# Patient Record
Sex: FEMALE | Race: WHITE | URBAN - METROPOLITAN AREA
[De-identification: names, ages, dates, MRNs, and addresses within clinical notes are randomized per-mention and may not be internally consistent; named-entity substitution may affect disease eponyms.]

---

## 2019-02-08 ENCOUNTER — LAB REQUISITION (OUTPATIENT)
Dept: LAB | Facility: HOSPITAL | Age: 49
End: 2019-02-08
Payer: COMMERCIAL

## 2019-02-08 DIAGNOSIS — N63.0 MASS OF BREAST: ICD-10-CM

## 2019-02-08 PROCEDURE — 88342 IMHCHEM/IMCYTCHM 1ST ANTB: CPT | Performed by: PATHOLOGY

## 2019-02-08 PROCEDURE — 88341 IMHCHEM/IMCYTCHM EA ADD ANTB: CPT | Performed by: PATHOLOGY

## 2019-02-08 PROCEDURE — 88305 TISSUE EXAM BY PATHOLOGIST: CPT | Performed by: PATHOLOGY

## 2021-04-06 DIAGNOSIS — Z23 ENCOUNTER FOR IMMUNIZATION: ICD-10-CM

## 2025-02-27 ENCOUNTER — OFFICE VISIT (OUTPATIENT)
Dept: OBGYN CLINIC | Facility: CLINIC | Age: 55
End: 2025-02-27
Payer: COMMERCIAL

## 2025-02-27 VITALS
SYSTOLIC BLOOD PRESSURE: 118 MMHG | BODY MASS INDEX: 48.07 KG/M2 | WEIGHT: 254.6 LBS | DIASTOLIC BLOOD PRESSURE: 68 MMHG | HEIGHT: 61 IN

## 2025-02-27 DIAGNOSIS — Z12.4 SCREENING FOR CERVICAL CANCER: ICD-10-CM

## 2025-02-27 DIAGNOSIS — Z12.11 COLON CANCER SCREENING: ICD-10-CM

## 2025-02-27 DIAGNOSIS — N39.3 STRESS INCONTINENCE: ICD-10-CM

## 2025-02-27 DIAGNOSIS — Z01.419 ENCOUNTER FOR ANNUAL ROUTINE GYNECOLOGICAL EXAMINATION: Primary | ICD-10-CM

## 2025-02-27 DIAGNOSIS — Z12.31 SCREENING MAMMOGRAM FOR BREAST CANCER: ICD-10-CM

## 2025-02-27 DIAGNOSIS — R23.2 HOT FLASH NOT DUE TO MENOPAUSE: ICD-10-CM

## 2025-02-27 PROCEDURE — G0145 SCR C/V CYTO,THINLAYER,RESCR: HCPCS | Performed by: OBSTETRICS & GYNECOLOGY

## 2025-02-27 PROCEDURE — G0476 HPV COMBO ASSAY CA SCREEN: HCPCS | Performed by: OBSTETRICS & GYNECOLOGY

## 2025-02-27 PROCEDURE — 99213 OFFICE O/P EST LOW 20 MIN: CPT | Performed by: OBSTETRICS & GYNECOLOGY

## 2025-02-27 PROCEDURE — S0610 ANNUAL GYNECOLOGICAL EXAMINA: HCPCS | Performed by: OBSTETRICS & GYNECOLOGY

## 2025-02-27 RX ORDER — ESTRADIOL 0.05 MG/D
1 PATCH, EXTENDED RELEASE TRANSDERMAL 2 TIMES WEEKLY
Qty: 24 PATCH | Refills: 4 | Status: SHIPPED | OUTPATIENT
Start: 2025-02-27

## 2025-02-27 RX ORDER — PROGESTERONE 100 MG/1
1 CAPSULE ORAL
Qty: 90 CAPSULE | Refills: 4 | Status: SHIPPED | OUTPATIENT
Start: 2025-02-27

## 2025-02-27 NOTE — ASSESSMENT & PLAN NOTE
- Discussed ACOG guidelines for pap smear screening frequency: performed today/not indicated today. Recommend repeat pap smear in XXX years.  - Discussed healthy lifestyle recommendations for diet, exercise and self breast awareness.  - Discussed ACOG recommendations for screening mammograms: overdue, ordered  - Discussed age based recommendations for adequate calcium and vitamin D intake. No additional osteoporosis screening indicated at this time.  - Discussed ACOG recommendations for colon cancer screening: no prior screening, GI referral placed for colonoscopy   - Safe sex practices were discussed and STI testing was not desired by the patient  - Contraceptive options were reviewed: n/a postmenopausal   - Routine follow up in 1 year was recommended or sooner as needed. All questions and concerns were addressed.

## 2025-02-27 NOTE — ASSESSMENT & PLAN NOTE
Vasomotor symptoms occur up to 80% of women an most women can describe them as severe. Most symptoms occur in the transition period but can also happen in late and postmenopausal period. Mean duration is about 5 years but symptoms can last even up to 10 years after the final menstrual period. General behavioral measures include lowering room temperature, layering of clothes, and removal of triggers such as spicy foods or stress. Women with moderate to severe symptoms may need medication in the form or hormonal therapy or non-hormonal therapy.    Discussed with patient menopausal hormone therapy (also known as HRT) weighing risks versus benefits. Risks associated with hormone replacement may include stroke, VTE, cardiovascular disease, breast cancer, endometrial hyperplasia and carcinoma. Discussed with the primary goal is to relieve hot flashes. The other symptoms associated with menopausal symptoms include sleep disturbances, mood disorders, and in some instances joint aches and pains.  Discussed that women being treated for menopausal symptoms such as hot flashes require systemic/oral estrogen while women with vaginal atrophy should be treated with topical/vaginal estrogen. With regards to route, available forms are oral tablets or transdermal in the form of patches or gels/spray. Discussed at the lowest effective dose should be used. Lower dosages typically are associated with less vaginal bleeding and less breast tenderness as well as few or affects on coagulation and lower risk of stroke and VTE.    Reviewed that ideal timing for initiation of hormone therapy would be within 10 years of menopause and younger than age 60 years old.     All women with intact uterus needed progestin  to prevent endometrial hyperplasia.  Dosing for progesterone may be micronized progesterone 200 mg per day for 12 days per month as a cyclic regimen or 100 mg daily continuous regimen.     In early postmenopause or late menopausal  transition, it yearly we start with oral estradiol 0.5 mg daily or 0.025mg twice weekly patch  with moderate symptoms.  For those with more severe symptoms 1 mg daily of estradiol is given or transdermal 0.05 mg twice weekly. Low estrogen oral contraceptive as an option for lori-menopausal woman who seek relief of menopausal symptoms and also desire contraception, given that they are candidates to receive contraceptive doses of estrogen. Typically, if hot flashes or completely relieved and patient is tolerating hormone replacement, this regimen is continued for several years with a goal of no more than 5 years total.     - pt desires estradiol patch with continuous oral progesterone. Will f/u in 3 months

## 2025-02-27 NOTE — PROGRESS NOTES
Subjective      Jess Rodrigues is a 54 y.o. female who presents for annual exam.      Chief Complaint   Patient presents with    New Patient Visit     Est care. Yearly, last yearly was in . Thinks she is in menopause and would like to discuss.       LMP, no bleeding since then   Frequent hot flashes, hair loss, weight gain  Hot flashes throughout the day but mostly at night - disrupting sleep   Denies vaginal sx or pain with intercourse   Stress incontinence - occasionally at night urgency episodes. Has to wear a pad most days   Denies prolapse sx           Last Pap:  ASCUS/HPV neg. 2017 ASC-H/HPV+  Last mammogram:  BIRADS1  Colorectal cancer screening: no prior   DEXA: n/a       Current contraception: post menopausal status  History of abnormal Pap smear: yes   History of abnormal mammogram: yes - left breast biopsy benign ()      Family history of uterine or ovarian cancer: no  Family history of breast cancer: no  Family history of colon cancer: no      Menstrual History:  OB History          3    Para   3    Term   3            AB        Living   3         SAB        IAB        Ectopic        Multiple        Live Births   3                    No LMP recorded (lmp unknown).         History reviewed. No pertinent past medical history.  Past Surgical History:   Procedure Laterality Date    BREAST BIOPSY  2019     Family History   Problem Relation Age of Onset    Diabetes Son     Breast cancer Neg Hx     Colon cancer Neg Hx     Ovarian cancer Neg Hx        Social History     Tobacco Use    Smoking status: Never    Smokeless tobacco: Never   Substance Use Topics    Alcohol use: Yes     Alcohol/week: 3.0 standard drinks of alcohol     Types: 3 Glasses of wine per week    Drug use: Not Currently     Types: Marijuana          Current Outpatient Medications:     estradiol (Vivelle-Dot) 0.05 MG/24HR, Place 1 patch on the skin 2 (two) times a week, Disp: 24 patch, Rfl: 4     "Progesterone 100 MG CAPS, Take 1 tablet by mouth daily at bedtime, Disp: 90 capsule, Rfl: 4    No Known Allergies        Review of Systems   Constitutional:  Negative for appetite change, chills and fever.   Eyes:  Negative for visual disturbance.   Respiratory:  Negative for cough, chest tightness and shortness of breath.    Cardiovascular:  Negative for chest pain.   Gastrointestinal:  Negative for abdominal distention, abdominal pain, constipation, diarrhea, nausea and vomiting.   Endocrine: Negative for cold intolerance and heat intolerance.   Genitourinary:  Negative for difficulty urinating, dyspareunia, dysuria, frequency, genital sores, pelvic pain, urgency, vaginal bleeding, vaginal discharge and vaginal pain.   Musculoskeletal:  Negative for arthralgias.   Neurological:  Negative for light-headedness and headaches.   Hematological:  Does not bruise/bleed easily.   Psychiatric/Behavioral:  Negative for behavioral problems.    All other systems reviewed and are negative.      /68 (BP Location: Right arm, Patient Position: Sitting, Cuff Size: Standard)   Ht 5' 1.2\" (1.554 m)   Wt 115 kg (254 lb 9.6 oz)   LMP  (LMP Unknown)   BMI 47.79 kg/m²         Physical Exam  Constitutional:       General: She is not in acute distress.     Appearance: Normal appearance. She is obese.   Genitourinary:      Vulva, bladder and urethral meatus normal.      No lesions in the vagina.      Right Labia: No rash, tenderness, lesions or skin changes.     Left Labia: No tenderness, lesions, skin changes or rash.     No labial fusion noted.      No inguinal adenopathy present in the right or left side.     No vaginal discharge, erythema, tenderness, bleeding or ulceration.      No vaginal prolapse present.       Right Adnexa: not tender, not full and no mass present.     Left Adnexa: not tender, not full and no mass present.     No cervical motion tenderness, discharge, friability, lesion or polyp.      Uterus is not " enlarged, fixed, tender or irregular.      No uterine mass detected.     Uterus is anteverted.      Pelvic exam was performed with patient in the lithotomy position.   Breasts:     Right: No swelling, bleeding, inverted nipple, mass, nipple discharge, skin change or tenderness.      Left: No swelling, bleeding, inverted nipple, mass, nipple discharge, skin change or tenderness.   HENT:      Head: Normocephalic and atraumatic.   Neck:      Thyroid: No thyromegaly.   Cardiovascular:      Rate and Rhythm: Normal rate and regular rhythm.   Pulmonary:      Effort: Pulmonary effort is normal. No accessory muscle usage or respiratory distress.   Abdominal:      General: There is no distension.      Palpations: Abdomen is soft.      Tenderness: There is no abdominal tenderness. There is no guarding or rebound.   Musculoskeletal:         General: Normal range of motion.      Cervical back: Normal range of motion and neck supple.   Lymphadenopathy:      Upper Body:      Right upper body: No supraclavicular or axillary adenopathy.      Left upper body: No supraclavicular or axillary adenopathy.      Lower Body: No right inguinal and no right inguinal adenopathy. No left inguinal and no left inguinal adenopathy.   Neurological:      General: No focal deficit present.      Mental Status: She is alert.   Skin:     General: Skin is warm and dry.      Findings: No erythema.   Psychiatric:         Mood and Affect: Mood normal.         Behavior: Behavior normal.   Vitals and nursing note reviewed. Exam conducted with a chaperone present.               Stress incontinence  Urogyn referral placed     Hot flash not due to menopause  Vasomotor symptoms occur up to 80% of women an most women can describe them as severe. Most symptoms occur in the transition period but can also happen in late and postmenopausal period. Mean duration is about 5 years but symptoms can last even up to 10 years after the final menstrual period. General  behavioral measures include lowering room temperature, layering of clothes, and removal of triggers such as spicy foods or stress. Women with moderate to severe symptoms may need medication in the form or hormonal therapy or non-hormonal therapy.    Discussed with patient menopausal hormone therapy (also known as HRT) weighing risks versus benefits. Risks associated with hormone replacement may include stroke, VTE, cardiovascular disease, breast cancer, endometrial hyperplasia and carcinoma. Discussed with the primary goal is to relieve hot flashes. The other symptoms associated with menopausal symptoms include sleep disturbances, mood disorders, and in some instances joint aches and pains.  Discussed that women being treated for menopausal symptoms such as hot flashes require systemic/oral estrogen while women with vaginal atrophy should be treated with topical/vaginal estrogen. With regards to route, available forms are oral tablets or transdermal in the form of patches or gels/spray. Discussed at the lowest effective dose should be used. Lower dosages typically are associated with less vaginal bleeding and less breast tenderness as well as few or affects on coagulation and lower risk of stroke and VTE.    Reviewed that ideal timing for initiation of hormone therapy would be within 10 years of menopause and younger than age 60 years old.     All women with intact uterus needed progestin  to prevent endometrial hyperplasia.  Dosing for progesterone may be micronized progesterone 200 mg per day for 12 days per month as a cyclic regimen or 100 mg daily continuous regimen.     In early postmenopause or late menopausal transition, it yearly we start with oral estradiol 0.5 mg daily or 0.025mg twice weekly patch  with moderate symptoms.  For those with more severe symptoms 1 mg daily of estradiol is given or transdermal 0.05 mg twice weekly. Low estrogen oral contraceptive as an option for lori-menopausal woman who seek  relief of menopausal symptoms and also desire contraception, given that they are candidates to receive contraceptive doses of estrogen. Typically, if hot flashes or completely relieved and patient is tolerating hormone replacement, this regimen is continued for several years with a goal of no more than 5 years total.     - pt desires estradiol patch with continuous oral progesterone. Will f/u in 3 months     Encounter for annual routine gynecological examination  - Discussed ACOG guidelines for pap smear screening frequency: performed today/not indicated today. Recommend repeat pap smear in XXX years.  - Discussed healthy lifestyle recommendations for diet, exercise and self breast awareness.  - Discussed ACOG recommendations for screening mammograms: overdue, ordered  - Discussed age based recommendations for adequate calcium and vitamin D intake. No additional osteoporosis screening indicated at this time.  - Discussed ACOG recommendations for colon cancer screening: no prior screening, GI referral placed for colonoscopy   - Safe sex practices were discussed and STI testing was not desired by the patient  - Contraceptive options were reviewed: n/a postmenopausal   - Routine follow up in 1 year was recommended or sooner as needed. All questions and concerns were addressed.

## 2025-02-28 LAB
HPV HR 12 DNA CVX QL NAA+PROBE: NEGATIVE
HPV16 DNA CVX QL NAA+PROBE: NEGATIVE
HPV18 DNA CVX QL NAA+PROBE: NEGATIVE

## 2025-03-05 LAB
LAB AP GYN PRIMARY INTERPRETATION: NORMAL
Lab: NORMAL

## 2025-03-06 ENCOUNTER — RESULTS FOLLOW-UP (OUTPATIENT)
Dept: OBGYN CLINIC | Facility: CLINIC | Age: 55
End: 2025-03-06

## 2025-03-29 PROBLEM — Z01.419 ENCOUNTER FOR ANNUAL ROUTINE GYNECOLOGICAL EXAMINATION: Status: RESOLVED | Noted: 2025-02-27 | Resolved: 2025-03-29

## 2025-04-08 ENCOUNTER — TELEPHONE (OUTPATIENT)
Age: 55
End: 2025-04-08

## 2025-04-08 ENCOUNTER — OFFICE VISIT (OUTPATIENT)
Dept: FAMILY MEDICINE CLINIC | Facility: CLINIC | Age: 55
End: 2025-04-08
Payer: COMMERCIAL

## 2025-04-08 VITALS
SYSTOLIC BLOOD PRESSURE: 134 MMHG | HEIGHT: 61 IN | WEIGHT: 255.2 LBS | DIASTOLIC BLOOD PRESSURE: 82 MMHG | OXYGEN SATURATION: 96 % | BODY MASS INDEX: 48.18 KG/M2 | HEART RATE: 87 BPM | TEMPERATURE: 97.1 F | RESPIRATION RATE: 16 BRPM

## 2025-04-08 DIAGNOSIS — Z00.00 ANNUAL PHYSICAL EXAM: Primary | ICD-10-CM

## 2025-04-08 DIAGNOSIS — E66.813 CLASS 3 SEVERE OBESITY WITHOUT SERIOUS COMORBIDITY WITH BODY MASS INDEX (BMI) OF 45.0 TO 49.9 IN ADULT, UNSPECIFIED OBESITY TYPE: ICD-10-CM

## 2025-04-08 PROCEDURE — 99386 PREV VISIT NEW AGE 40-64: CPT | Performed by: FAMILY MEDICINE

## 2025-04-08 RX ORDER — TIRZEPATIDE 2.5 MG/.5ML
2.5 INJECTION, SOLUTION SUBCUTANEOUS WEEKLY
Qty: 2 ML | Refills: 0 | Status: SHIPPED | OUTPATIENT
Start: 2025-04-08 | End: 2025-05-06

## 2025-04-08 NOTE — PROGRESS NOTES
Adult Annual Physical  Name: Jess Rodrigues      : 1970      MRN: [id number]  Encounter Provider: Kristen Howell MD  Encounter Date: 2025   Encounter department: [facility]    :  Assessment & Plan  Annual physical exam  - Satisfactory physical examination   - Patient already scheduled for mammogram and recently had pap smear  - Referral to Gastroenterology already in the system for colon cancer screening        Class 3 severe obesity without serious comorbidity with body mass index (BMI) of 45.0 to 49.9 in adult, unspecified obesity type (HCC)  Prior Authorization Clinical Questions for Weight Management Pharmacotherapy    1. Does the patient have a contrainidcation to medication prescribed for weight management?: No  2. Does the patient have a diagnosis of obesity, confirmed by a BMI greater than or equal to 30 kg/m^2?: Yes  3. Does the patient have a BMI of greater than or equal to 27 kg/m^2 with at least one weight-related comorbidity/risk factor/complication (e.g. diabetes, dyslipidemia, coronary artery disease)?: No  5. WEGOVY CVA Indication: Does patient have established documented cardiovascular disease (history of a prior heart attack (myocardial infarction), stroke, or symptomatic peripheral arterial disease (PAD)?: No  6. ZEPBOUND MAURA Indication: Does patient have documented MAURA diagnosed via sleep study (insurance will require copy of sleep study results for approval)?: No  7. Has the patient been on a weight loss regimen of low-calorie diet, increased physical activity, and lifestyle modifications for a minimum of 6 months?: Yes  8. Has the patient completed a comprehensive weight loss program (ie, Weight Watchers, Noom, Bariatrics, other denzel on phone)? If so, what?: No  9. Does the patient have a history of type 2 diabetes?: No  10. Has the member tried and failed other weight loss medication within the past 12 months?: No  11. Will the member use requested medication in  combination with another GLP agonist or weight loss drug?: No  12. Is the medication a controlled substance?: No     Baseline weight (in pounds): 255 lbs       - BMI Counseling: Body mass index is 47.9 kg/m². The BMI is above normal. Nutrition recommendations include 3-5 servings of fruits/vegetables daily, moderation in carbohydrate intake, increasing intake of lean protein, and reducing intake of saturated fat and trans fat. Exercise recommendations include moderate aerobic physical activity for 150 minutes/week. Pharmacotherapy was ordered for patient to aid in weight loss. Patient referred to nutritionist due to patient being obese.   - Discussed potential side effects of Zepbound, no family or personal history of medullary thyroid cancer or MEN 2 syndrome. Patient aware it may require a prior authorization  - Follow up in 6 weeks   Orders:    tirzepatide (Zepbound) 2.5 mg/0.5 mL auto-injector; Inject 0.5 mL (2.5 mg total) under the skin once a week for 28 days    CBC and differential; Future    Comprehensive metabolic panel; Future    Lipid Panel with Direct LDL reflex; Future    Hemoglobin A1C; Future    Ambulatory Referral to Nutrition Services; Future        Preventive Screenings:    - Cervical cancer screening: screening up-to-date   - Breast cancer screening: screening up-to-date     Immunizations:  - Immunizations due: Influenza, Tdap and Zoster (Shingrix)         History of Present Illness   Jess Rodrigues is a very pleasant 54 year old female who presents today for an encounter to establish care and annual physical. Overall she feels well however her main concern today is regarding her weight. She states that it is something she has always struggled with however since menopause it has become more of a problem. She has been thinking about weight loss options and is interested in combining pharmacotherapy and nutrition services. Apart from that she endorses no other complaints at  this time. She was  "recently started on estrogen and progesterone by her OB/GYN which has been helping with her menopausal symptoms.     Adult Annual Physical:  Patient presents for annual physical.     Depression Screening:  - PHQ-2 Score: 0    General Health:  - Sleep: sleeps well.  - Vision:. Wears readers  - Dental: regular dental visits.    Review of Systems   Constitutional: Negative.    HENT: Negative.     Eyes: Negative.    Respiratory: Negative.     Cardiovascular: Negative.    Gastrointestinal: Negative.    Genitourinary: Negative.    Musculoskeletal: Negative.    Skin: Negative.    Neurological: Negative.    Psychiatric/Behavioral: Negative.           Objective   /82 (BP Location: Left arm, Patient Position: Sitting, Cuff Size: Large)   Pulse 87   Temp (!) 97.1 °F (36.2 °C) (Tympanic)   Resp 16   Ht 5' 1.2\" (1.554 m)   Wt 116 kg (255 lb 3.2 oz)   SpO2 96%   BMI 47.90 kg/m²     Physical Exam  Constitutional:       General: She is not in acute distress.     Appearance: She is not ill-appearing.   HENT:      Head: Normocephalic and atraumatic.   Eyes:      General:         Right eye: No discharge.         Left eye: No discharge.      Extraocular Movements: Extraocular movements intact.   Cardiovascular:      Rate and Rhythm: Normal rate.   Pulmonary:      Effort: Pulmonary effort is normal. No respiratory distress.      Breath sounds: No wheezing.   Abdominal:      General: Bowel sounds are normal. There is no distension.      Palpations: Abdomen is soft.      Tenderness: There is no abdominal tenderness.   Neurological:      General: No focal deficit present.      Mental Status: She is alert.      Motor: No weakness.      Coordination: Coordination normal.      Gait: Gait normal.      Deep Tendon Reflexes: Reflexes normal.   Psychiatric:         Mood and Affect: Mood normal.         Behavior: Behavior normal.         "

## 2025-04-08 NOTE — TELEPHONE ENCOUNTER
PA for e (Zepbound) 2.5 mg/ SUBMITTED to     via    [x][facility]-KEY: [id number]  []Surescripts-Case ID #   []Availity-Auth ID # NDC #   []Faxed to plan   []Other website   []Phone call Case ID #     [x]PA sent as URGENT    All office notes, labs and other pertaining documents and studies sent. Clinical questions answered. Awaiting determination from insurance company.     Turnaround time for your insurance to make a decision on your Prior Authorization can take 7-21 business days.

## 2025-04-09 ENCOUNTER — APPOINTMENT (OUTPATIENT)
Dept: LAB | Facility: CLINIC | Age: 55
End: 2025-04-09
Payer: COMMERCIAL

## 2025-04-09 DIAGNOSIS — E66.813 CLASS 3 SEVERE OBESITY WITHOUT SERIOUS COMORBIDITY WITH BODY MASS INDEX (BMI) OF 45.0 TO 49.9 IN ADULT, UNSPECIFIED OBESITY TYPE: ICD-10-CM

## 2025-04-09 LAB
ALBUMIN SERPL BCG-MCNC: 4.3 G/DL (ref 3.5–5)
ALP SERPL-CCNC: 51 U/L (ref 34–104)
ALT SERPL W P-5'-P-CCNC: 22 U/L (ref 7–52)
ANION GAP SERPL CALCULATED.3IONS-SCNC: 8 MMOL/L (ref 4–13)
AST SERPL W P-5'-P-CCNC: 18 U/L (ref 13–39)
BASOPHILS # BLD AUTO: 0.08 THOUSANDS/ÂΜL (ref 0–0.1)
BASOPHILS NFR BLD AUTO: 1 % (ref 0–1)
BILIRUB SERPL-MCNC: 0.76 MG/DL (ref 0.2–1)
BUN SERPL-MCNC: 13 MG/DL (ref 5–25)
CALCIUM SERPL-MCNC: 9.2 MG/DL (ref 8.4–10.2)
CHLORIDE SERPL-SCNC: 106 MMOL/L (ref 96–108)
CHOLEST SERPL-MCNC: 198 MG/DL (ref ?–200)
CO2 SERPL-SCNC: 24 MMOL/L (ref 21–32)
CREAT SERPL-MCNC: 0.73 MG/DL (ref 0.6–1.3)
EOSINOPHIL # BLD AUTO: 0.18 THOUSAND/ÂΜL (ref 0–0.61)
EOSINOPHIL NFR BLD AUTO: 3 % (ref 0–6)
ERYTHROCYTE [DISTWIDTH] IN BLOOD BY AUTOMATED COUNT: 12.9 % (ref 11.6–15.1)
EST. AVERAGE GLUCOSE BLD GHB EST-MCNC: 108 MG/DL
GFR SERPL CREATININE-BSD FRML MDRD: 94 ML/MIN/1.73SQ M
GLUCOSE P FAST SERPL-MCNC: 92 MG/DL (ref 65–99)
HBA1C MFR BLD: 5.4 %
HCT VFR BLD AUTO: 42.7 % (ref 34.8–46.1)
HDLC SERPL-MCNC: 69 MG/DL
HGB BLD-MCNC: 14 G/DL (ref 11.5–15.4)
IMM GRANULOCYTES # BLD AUTO: 0.01 THOUSAND/UL (ref 0–0.2)
IMM GRANULOCYTES NFR BLD AUTO: 0 % (ref 0–2)
LDLC SERPL CALC-MCNC: 116 MG/DL (ref 0–100)
LYMPHOCYTES # BLD AUTO: 2 THOUSANDS/ÂΜL (ref 0.6–4.47)
LYMPHOCYTES NFR BLD AUTO: 32 % (ref 14–44)
MCH RBC QN AUTO: 32.4 PG (ref 26.8–34.3)
MCHC RBC AUTO-ENTMCNC: 32.8 G/DL (ref 31.4–37.4)
MCV RBC AUTO: 99 FL (ref 82–98)
MONOCYTES # BLD AUTO: 0.59 THOUSAND/ÂΜL (ref 0.17–1.22)
MONOCYTES NFR BLD AUTO: 10 % (ref 4–12)
NEUTROPHILS # BLD AUTO: 3.34 THOUSANDS/ÂΜL (ref 1.85–7.62)
NEUTS SEG NFR BLD AUTO: 54 % (ref 43–75)
NRBC BLD AUTO-RTO: 0 /100 WBCS
PLATELET # BLD AUTO: 253 THOUSANDS/UL (ref 149–390)
PMV BLD AUTO: 10.7 FL (ref 8.9–12.7)
POTASSIUM SERPL-SCNC: 3.8 MMOL/L (ref 3.5–5.3)
PROT SERPL-MCNC: 7.1 G/DL (ref 6.4–8.4)
RBC # BLD AUTO: 4.32 MILLION/UL (ref 3.81–5.12)
SODIUM SERPL-SCNC: 138 MMOL/L (ref 135–147)
TRIGL SERPL-MCNC: 63 MG/DL (ref ?–150)
WBC # BLD AUTO: 6.2 THOUSAND/UL (ref 4.31–10.16)

## 2025-04-09 PROCEDURE — 80053 COMPREHEN METABOLIC PANEL: CPT

## 2025-04-09 PROCEDURE — 36415 COLL VENOUS BLD VENIPUNCTURE: CPT

## 2025-04-09 PROCEDURE — 83036 HEMOGLOBIN GLYCOSYLATED A1C: CPT

## 2025-04-09 PROCEDURE — 85025 COMPLETE CBC W/AUTO DIFF WBC: CPT

## 2025-04-09 PROCEDURE — 80061 LIPID PANEL: CPT

## 2025-04-09 NOTE — TELEPHONE ENCOUNTER
PA for (Zepbound) 2.5 mg/0.  DENIED    Reason:        Message sent to office clinical pool Yes    Denial letter scanned into Media Yes    Appeal started No (Provider will need to decide if appeal is warranted and send clinical documentation to Prior Authorization Team for initiation.)    **Please follow up with your patient regarding denial and next steps**

## 2025-04-09 NOTE — TELEPHONE ENCOUNTER
Can you let the patient know that the medication was denied for the above reasons.  I am still waiting for blood work to come back to see if she may also have a medical condition that would justify the medication and will reach out to her via [employer] when I get all the results.  As we discussed yesterday if she wanted to bypass insurance I could send the medication to [facility] although it would cost $500

## 2025-04-11 NOTE — TELEPHONE ENCOUNTER
Pt called back. Read doctor message. Pt would like to wait until doctor reviews labs and if that wont work then she will go with Joann Gomez

## 2025-04-12 PROBLEM — E78.5 DYSLIPIDEMIA: Status: ACTIVE | Noted: 2025-04-12

## 2025-04-12 PROBLEM — K76.0 STEATOSIS OF LIVER: Status: ACTIVE | Noted: 2025-04-12

## 2025-04-12 NOTE — TELEPHONE ENCOUNTER
So lab work showed dyslipidemia with mildly increase LDL (not high enough to start a medication). I was also looking through her chart and looks like when she had a CT in 2022 it made note of fatty liver which would be another indication to start weight loss medication. It looks like insurance also need documentation for diet modification in the form of a dietary log as well evidence of physical activity such as gym membership, summary report of physical activity etc. Not sure if she has any of these to submit to insurance?

## 2025-04-15 NOTE — TELEPHONE ENCOUNTER
Patient returned called, states practice called, conveyed lab result review and insurance documentation requirements.  left By provider Dr. Howell. Patient states shannon has at home gym equipment , and request clarification regarding the dietary log. Patient states she has yet to make appointment with nutritionist  Patient request  callback with suggestions. Please advise patient at  [phone], if any further questions.

## 2025-04-19 ENCOUNTER — PATIENT MESSAGE (OUTPATIENT)
Dept: FAMILY MEDICINE CLINIC | Facility: CLINIC | Age: 55
End: 2025-04-19

## 2025-05-12 ENCOUNTER — HOSPITAL ENCOUNTER (OUTPATIENT)
Dept: MAMMOGRAPHY | Facility: MEDICAL CENTER | Age: 55
Discharge: HOME | End: 2025-05-12
Payer: COMMERCIAL

## 2025-05-12 VITALS — WEIGHT: 255 LBS | BODY MASS INDEX: 48.15 KG/M2 | HEIGHT: 61 IN

## 2025-05-12 DIAGNOSIS — Z12.31 SCREENING MAMMOGRAM FOR BREAST CANCER: ICD-10-CM

## 2025-05-12 PROCEDURE — 77063 BREAST TOMOSYNTHESIS BI: CPT

## 2025-05-12 PROCEDURE — 77067 SCR MAMMO BI INCL CAD: CPT

## 2025-05-16 ENCOUNTER — RA CDI HCC (OUTPATIENT)
Dept: OTHER | Facility: HOSPITAL | Age: 55
End: 2025-05-16

## 2025-05-23 ENCOUNTER — TELEPHONE (OUTPATIENT)
Dept: FAMILY MEDICINE CLINIC | Facility: CLINIC | Age: 55
End: 2025-05-23

## 2025-05-23 ENCOUNTER — OFFICE VISIT (OUTPATIENT)
Dept: FAMILY MEDICINE CLINIC | Facility: CLINIC | Age: 55
End: 2025-05-23

## 2025-05-23 VITALS
DIASTOLIC BLOOD PRESSURE: 72 MMHG | HEART RATE: 84 BPM | WEIGHT: 255.2 LBS | SYSTOLIC BLOOD PRESSURE: 110 MMHG | TEMPERATURE: 97.3 F | OXYGEN SATURATION: 97 % | BODY MASS INDEX: 48.18 KG/M2 | HEIGHT: 61 IN

## 2025-05-23 DIAGNOSIS — E66.813 CLASS 3 SEVERE OBESITY WITH SERIOUS COMORBIDITY AND BODY MASS INDEX (BMI) OF 45.0 TO 49.9 IN ADULT, UNSPECIFIED OBESITY TYPE: Primary | ICD-10-CM

## 2025-05-23 DIAGNOSIS — E78.5 DYSLIPIDEMIA: ICD-10-CM

## 2025-05-23 DIAGNOSIS — K76.0 STEATOSIS OF LIVER: ICD-10-CM

## 2025-05-23 NOTE — TELEPHONE ENCOUNTER
Can someone please reach out to the prior authorization team about the Zepbound? Insurance company requested dietary logs for Zepbound to be approved which patient has already submitted. Thank you

## 2025-05-24 RX ORDER — TIRZEPATIDE 2.5 MG/.5ML
2.5 INJECTION, SOLUTION SUBCUTANEOUS WEEKLY
Qty: 2 ML | Refills: 0 | Status: SHIPPED | OUTPATIENT
Start: 2025-05-24 | End: 2025-06-21

## 2025-05-24 NOTE — PROGRESS NOTES
Name: Jess Rodrigues      : 1970      MRN: [id number]  Encounter Provider: Kristen Howell MD  Encounter Date: 2025   Encounter department: [facility]  :  Assessment & Plan  Class 3 severe obesity with serious comorbidity and body mass index (BMI) of 45.0 to 49.9 in adult, unspecified obesity type  Prior Authorization Clinical Questions for Weight Management Pharmacotherapy    1. Does the patient have a contrainidcation to medication prescribed for weight management?: No  2. Does the patient have a diagnosis of obesity, confirmed by a BMI greater than or equal to 30 kg/m^2?: Yes  3. Does the patient have a BMI of greater than or equal to 27 kg/m^2 with at least one weight-related comorbidity/risk factor/complication (e.g. diabetes, dyslipidemia, coronary artery disease)?: Yes  4. Weight-related co-morbidities/risk factors: dyslipidemia, metabolic dysfunction-associated steatotic liver disease (MASLD)  5. WEGOVY CVA Indication: Does patient have established documented cardiovascular disease (history of a prior heart attack (myocardial infarction), stroke, or symptomatic peripheral arterial disease (PAD)?: No  6. ZEPBOUND MAURA Indication: Does patient have documented MAURA diagnosed via sleep study (insurance will require copy of sleep study results for approval)?: No  7. Has the patient been on a weight loss regimen of low-calorie diet, increased physical activity, and lifestyle modifications for a minimum of 6 months?: Yes  8. Has the patient completed a comprehensive weight loss program (ie, Weight Watchers, Noom, Bariatrics, other denzel on phone)? If so, what?: No  9. Does the patient have a history of type 2 diabetes?: No  10. Has the member tried and failed other weight loss medication within the past 12 months?: No  11. Will the member use requested medication in combination with another GLP agonist or weight loss drug?: No  12. Is the medication a controlled substance?: No      Baseline weight (in pounds): 255 lbs     Unfortunately patient's insurance company has denied Zepbound as they are requesting documented evidence of diet and lifestyle modifications which patient has already provided.  Will message clinical staff to reach out to the prior authorization team regarding an appeal given this new information.  Patient does have 2 comorbidities that would qualify her for weight loss medication including dyslipidemia and fatty liver.  If insurance company still does not approve medication we have talked about bypassing insurance and going through [facility].  Patient is also read about the third-party companies such as[employer]  Other options include phentermine as well as Wellbutrin/naltrexone.    Orders:    tirzepatide (Zepbound) 2.5 mg/0.5 mL auto-injector; Inject 0.5 mL (2.5 mg total) under the skin once a week for 28 days    Dyslipidemia    Orders:    tirzepatide (Zepbound) 2.5 mg/0.5 mL auto-injector; Inject 0.5 mL (2.5 mg total) under the skin once a week for 28 days    Steatosis of liver    Orders:    tirzepatide (Zepbound) 2.5 mg/0.5 mL auto-injector; Inject 0.5 mL (2.5 mg total) under the skin once a week for 28 days           History of Present Illness   HPI    Jess Rodrigues is a very pleasant 54-year-old female who presents today for a follow-up.  At previous office visit patient was started on Zepbound however it was denied by her insurance company who wanted documentation of diet and lifestyle changes which patient has provided.  Patient is understandably very frustrated as she has been employing diet and lifestyle modifications for at least 6 months but has not noticed any improvement in her weight and also has medical comorbidities which would qualify her for weight loss medication.    Review of Systems   Constitutional: Negative.    HENT: Negative.     Eyes: Negative.    Respiratory: Negative.     Cardiovascular: Negative.    Gastrointestinal: Negative.   "  Genitourinary: Negative.    Musculoskeletal: Negative.    Skin: Negative.    Neurological: Negative.    Psychiatric/Behavioral: Negative.         Objective   /72 (BP Location: Left arm, Patient Position: Sitting, Cuff Size: Large)   Pulse 84   Temp (!) 97.3 °F (36.3 °C) (Temporal)   Ht 5' 1.2\" (1.554 m)   Wt 116 kg (255 lb 3.2 oz)   LMP  (LMP Unknown)   SpO2 97%   BMI 47.90 kg/m²      Physical Exam  Constitutional:       General: She is not in acute distress.     Appearance: She is not ill-appearing.   HENT:      Head: Normocephalic and atraumatic.     Eyes:      General:         Right eye: No discharge.         Left eye: No discharge.      Extraocular Movements: Extraocular movements intact.       Cardiovascular:      Rate and Rhythm: Normal rate.   Pulmonary:      Effort: Pulmonary effort is normal. No respiratory distress.      Breath sounds: No wheezing.     Neurological:      General: No focal deficit present.      Mental Status: She is alert.     Psychiatric:         Mood and Affect: Mood normal.         Behavior: Behavior normal.         "

## 2025-05-27 ENCOUNTER — TELEPHONE (OUTPATIENT)
Dept: FAMILY MEDICINE CLINIC | Facility: CLINIC | Age: 55
End: 2025-05-27

## 2025-05-27 NOTE — TELEPHONE ENCOUNTER
Provider is asking for information. Prior auth was done for patient on 04/08, and an appeal request was requested but we have no idea if it was done. Please review and advise thanks. Patient had attached the food log diet that she has been doing for the appeal.

## 2025-05-27 NOTE — TELEPHONE ENCOUNTER
[place] Appeal for (Zepbound) 2.5 mg  APPROVED     Date(s) approved 03/27/2025-12/26/2025    Case #     Patient advised by          []MyChart Message  []Phone call   [x]LMOM  []L/M to call office as no active Communication consent on file  []Unable to leave detailed message as VM not approved on Communication consent       Pharmacy advised by    [x]Fax  []Phone call    Message sent to office clinical pool Yes    Approval letter scanned into Media Yes

## 2025-05-27 NOTE — TELEPHONE ENCOUNTER
PA for Zepbound) 2.5 mg  APPEALED via     []CMM  []SS  [x]Letter sent to insurance via fax [phone]  []Other site or means     All necessary records sent. Will await response from insurance company    Turnaround time for a decision to be made on an appeal could take up to 30 business days

## 2025-06-03 ENCOUNTER — OFFICE VISIT (OUTPATIENT)
Dept: OBGYN CLINIC | Facility: CLINIC | Age: 55
End: 2025-06-03

## 2025-06-03 VITALS
HEART RATE: 87 BPM | DIASTOLIC BLOOD PRESSURE: 70 MMHG | WEIGHT: 255.8 LBS | HEIGHT: 61 IN | BODY MASS INDEX: 48.3 KG/M2 | OXYGEN SATURATION: 98 % | SYSTOLIC BLOOD PRESSURE: 114 MMHG

## 2025-06-03 DIAGNOSIS — R23.2 HOT FLASH NOT DUE TO MENOPAUSE: Primary | ICD-10-CM

## 2025-06-03 NOTE — ASSESSMENT & PLAN NOTE
Sx greatly improved within about 2 week of starting medication. No concerning side effects. Advised can continue, she has refills. Will f/u next year for annual exam. Precautions reviewed

## 2025-06-03 NOTE — PROGRESS NOTES
"    Subjective   Patient ID: Jess Rodrigues is a 54 y.o. female.    Patient is here for a problem visit.    Chief Complaint   Patient presents with    Follow-up     Follow up to menopause, med check. No concerns.      Last sen 2025. Started on estradiol patch (0.05 mg) with daily oral progesterone   Sleep improved, minimal hot flashes   No vaginal bleeding   Denies any concerning side effects or issues with adherence of patch  Plans to schedule urogyn appt soon     Menstrual History:  OB History          3    Para   3    Term   3            AB        Living   3         SAB        IAB        Ectopic        Multiple        Live Births   3                  No LMP recorded (lmp unknown). Patient is postmenopausal.         Past Medical History[1]    Past Surgical History[2]    Social History[3]     No Known Allergies    Current Medications[4]      Review of Systems   Constitutional:  Negative for appetite change, chills and fever.   Eyes:  Negative for visual disturbance.   Respiratory:  Negative for cough, chest tightness and shortness of breath.    Cardiovascular:  Negative for chest pain.   Gastrointestinal:  Negative for abdominal distention, abdominal pain, constipation, diarrhea, nausea and vomiting.   Endocrine: Negative for cold intolerance and heat intolerance.   Genitourinary:  Negative for difficulty urinating, dyspareunia, dysuria, frequency, genital sores, pelvic pain, urgency, vaginal bleeding, vaginal discharge and vaginal pain.   Musculoskeletal:  Negative for arthralgias.   Neurological:  Negative for light-headedness and headaches.   Hematological:  Does not bruise/bleed easily.   Psychiatric/Behavioral:  Negative for behavioral problems.    All other systems reviewed and are negative.        /70 (BP Location: Right arm, Patient Position: Sitting, Cuff Size: Standard)   Pulse 87   Ht 5' 1.2\" (1.554 m)   Wt 116 kg (255 lb 12.8 oz)   LMP  (LMP Unknown)   SpO2 98%   BMI " 48.02 kg/m²       Physical Exam  Constitutional:       General: She is not in acute distress.     Appearance: Normal appearance.   HENT:      Head: Normocephalic and atraumatic.     Cardiovascular:      Rate and Rhythm: Normal rate.   Pulmonary:      Effort: Pulmonary effort is normal. No respiratory distress.     Neurological:      General: No focal deficit present.      Mental Status: She is alert.     Psychiatric:         Mood and Affect: Mood normal.         Behavior: Behavior normal.   Vitals and nursing note reviewed.               Appropriate laboratory testing, imaging studies, and prior external records were reviewed:     Assessment/Plan:       Problem List Items Addressed This Visit       Hot flash not due to menopause - Primary    Sx greatly improved within about 2 week of starting medication. No concerning side effects. Advised can continue, she has refills. Will f/u next year for annual exam. Precautions reviewed                         [1] No past medical history on file.  [2]   Past Surgical History:  Procedure Laterality Date    BREAST BIOPSY Left 02/2019    U/s-benign   [3]   Social History  Tobacco Use    Smoking status: Never    Smokeless tobacco: Never   Vaping Use    Vaping status: Never Used   Substance Use Topics    Alcohol use: Yes     Alcohol/week: 3.0 standard drinks of alcohol     Types: 3 Glasses of wine per week    Drug use: Not Currently     Types: Marijuana   [4]   Current Outpatient Medications:     estradiol (Vivelle-Dot) 0.05 MG/24HR, Place 1 patch on the skin 2 (two) times a week, Disp: 24 patch, Rfl: 4    Progesterone 100 MG CAPS, Take 1 tablet by mouth daily at bedtime, Disp: 90 capsule, Rfl: 4    tirzepatide (Zepbound) 2.5 mg/0.5 mL auto-injector, Inject 0.5 mL (2.5 mg total) under the skin once a week for 28 days, Disp: 2 mL, Rfl: 0

## 2025-07-19 DIAGNOSIS — K76.0 STEATOSIS OF LIVER: ICD-10-CM

## 2025-07-19 DIAGNOSIS — E66.813 CLASS 3 SEVERE OBESITY WITH SERIOUS COMORBIDITY AND BODY MASS INDEX (BMI) OF 45.0 TO 49.9 IN ADULT, UNSPECIFIED OBESITY TYPE: ICD-10-CM

## 2025-07-19 DIAGNOSIS — E78.5 DYSLIPIDEMIA: ICD-10-CM

## 2025-07-22 DIAGNOSIS — E78.5 DYSLIPIDEMIA: ICD-10-CM

## 2025-07-22 DIAGNOSIS — K76.0 STEATOSIS OF LIVER: ICD-10-CM

## 2025-07-22 DIAGNOSIS — E66.813 CLASS 3 SEVERE OBESITY WITH SERIOUS COMORBIDITY AND BODY MASS INDEX (BMI) OF 45.0 TO 49.9 IN ADULT, UNSPECIFIED OBESITY TYPE: Primary | ICD-10-CM

## 2025-07-22 RX ORDER — TIRZEPATIDE 2.5 MG/.5ML
2.5 INJECTION, SOLUTION SUBCUTANEOUS WEEKLY
OUTPATIENT
Start: 2025-07-22 | End: 2025-08-19

## 2025-07-22 RX ORDER — TIRZEPATIDE 5 MG/.5ML
5 INJECTION, SOLUTION SUBCUTANEOUS WEEKLY
Qty: 6 ML | Refills: 0 | Status: SHIPPED | OUTPATIENT
Start: 2025-07-22

## 2025-07-22 NOTE — TELEPHONE ENCOUNTER
Please let patient know that I will be sending the 5mg weekly dose. Can we also set her up for a follow up appointment? Thank you

## 2025-07-24 NOTE — TELEPHONE ENCOUNTER
Left message for patient to schedule a follow up with . Also to inform her  sent 5mg of her Zepbound.